# Patient Record
Sex: MALE | Race: WHITE | Employment: OTHER | URBAN - NONMETROPOLITAN AREA
[De-identification: names, ages, dates, MRNs, and addresses within clinical notes are randomized per-mention and may not be internally consistent; named-entity substitution may affect disease eponyms.]

---

## 2020-10-04 NOTE — PROGRESS NOTES
[facility]  Pulmonary medicine clinic initial consult note. Patient: Christopher Jennings  : 1935     Lung Nodule Screening     []? Qualifies    [x]? Does not qualify   []? Declined    []? Completed      Chief complaint/[place]:  Christopher Jennings is a 82 y.o. old male came for further evaluation regarding his dyspnea with referral from Dr. Marley Moore DO. He is currently on treatment with following inhalers/Nebs:  -Albuterol HFA 90mcg/Spray MDI, 2puffs  Q6Hprn.  -Albuterol 2.5mg nebs Q6h prn (the nebulizer). He is currently not using any oxygen supplementation at rest, exercise or during sleep/at night time. He is having shortness of breath: Yes  Onset: gradual   Duration:2 to 3 years. It is getting worse for the last 6months  Diurnal variation:  None. Functional status prior to beginning of symptoms: 5 to 6blocks on level ground. Current functional capacity on level ground: <1 block/s on level ground. He can climb steps: No  He admits to orthopnea. He admits to paroxysmal nocturnal dyspnea. He is having cough: No  Hemoptysis:No  Diurnal variation: None. He is having chest pain:No.    He is currently not using any oxygen supplementation at rest, exercise or during sleep/at night time. He was never diagnosed with pulmonary diseases I.e bronchial asthma, COPD,Pulmonary fibrosis, Sarcoidosis, pulmonary embolism,pulmonary hypertension or pleural effusion/s in the past.    He denies any hemoptysis. He was never diagnosed with pulmonary tuberculosis in the past. He denies any recent exposure to any patients with tuberculosis. He denies any recent travel to endemic places of tuberculosis.      Wells Score:    Sign/Symptom:                  Score:    Symptoms of  DVT :    0.       (3)                                 Tachycardia:               0. (1.5)  Immobilization:           0. (1.5)  History of VTE:           0. (1)  Malignancy:                0 (1)  Hemoptysis:               0. (1)  No alternative diagnosis: 3  (3)    Total score:         3      Sleeping habits:  Time to go to bed: 10:30            PM  Time to wake up: 6:00        AM    Sleep History:  Pt with history of:  Morning headache:Yes   Dryness of mouth in the morning:No  Hx of snoring:Yes- Occasionally. Witnessed apneas:No  Excessive day time sleepiness:Yes. See below for [place] score  Hypnogogic Hallucinations:NO  Hypnopompic Hallucinations:NO  Symptoms suggestive of Restless leg syndrome:NO  History of Seizures:NO  Sleep Walking:NO  Sleep Talking:NO  Sleep paralysis: NO  Cataplexy: NO      [place] Sleepiness Score:   Sitting and readin- He don't read. Watching TV:3  Sitting inactive in a public place:2- recliner  Being a passenger in a motor vehicle for an hour or more:0  Lying down in the afternoon:2  Sitting and talking to someone:0  Sitting quietly after lunch (no alcohol):3  Stopped for a few minutes in traffic while drivin  Total GTYPH:04    Neck Circumference - 18.25 in            Mallampati - IV    Patient considerations: None of these- > Wheelchair, Jacob Jacqueline, Hearing deficit, Claustrophobic, MDD, Blind, Para/Quadraplegic,       Social History:  Occupation:  He is current working: No  Type of profession: retired. History of tobacco smoking:Yes  Amount of tobacco smokin.0 PPD. Years of tobacco smokin                                    Quit smoking: Yes. Quit year: 10years back.   Current smoker: No.         History of recreational or IV drug use in the past:NO     History of exposure to coal mines/coal dust: NO  History of exposure to foundry dust/welding: NO  History of exposure to quarry/silica/sandblasting: NO  History of exposure to asbestos/working with breaks/ships: NO  History of exposure to farm dust: NO  History of recent travel to long distances: NO  History of exposure to birds, pigeons, or chickens in the past:NO  Pet animals at home:No    History of pulmonary embolism in the past: No            History of DVT in the past:No                             Review of Systems:   General/Constitutional: No recent loss of weight or appetite changes. No fever or chills. HENT: Negative. Eyes: Negative. Upper respiratory tract: No nasal stuffiness or post nasal drip. Lower respiratory tract/ lungs: No cough or sputum production. No hemoptysis. Cardiovascular: No palpitations or chest pain. Gastrointestinal: No nausea or vomiting. Neurological: No focal neurologiacal weakness. Extremities: No edema. Musculoskeletal: No complaints. Genitourinary: No complaints. Hematological: Negative. Psychiatric/Behavioral: Negative. Skin: No itching. Current Medications:      No past medical history on file. Past Surgical History:   Procedure Laterality Date    CARDIAC SURGERY  03/28/2013       No Known Allergies    Current Outpatient Medications   Medication Sig Dispense Refill    predniSONE (DELTASONE) 10 MG tablet Take 10 mg by mouth daily      atorvastatin (LIPITOR) 20 MG tablet Take 20 mg by mouth daily      albuterol sulfate HFA (VENTOLIN HFA) 108 (90 Base) MCG/ACT inhaler Inhale 2 puffs into the lungs every 6 hours as needed for Wheezing      albuterol (PROVENTIL) (2.5 MG/3ML) 0.083% nebulizer solution Take 2.5 mg by nebulization every 6 hours as needed for Wheezing      furosemide (LASIX) 20 MG tablet Take 20 mg by mouth See Admin Instructions Every other day      loratadine (CLARITIN) 10 MG tablet Take 10 mg by mouth daily      aspirin 81 MG EC tablet Take 81 mg by mouth daily       No current facility-administered medications for this visit. No family history on file.         Physical Exam:     VITALS:  /64 (Site: Left Upper Arm, Position: Sitting, Cuff Size: Large Adult)   Pulse 86   Temp 97.6 °F (36.4 °C) (Tympanic)   Ht 6' (1.829 m)   Wt 232 lb 3.2 oz (105.3 kg)   SpO2 97% Comment: on room air at rest  BMI 31.49 kg/m²   Nursing note and vitals reviewed. Constitutional: Patient appears well built and well nourished. No distress. Patient is oriented to person, place, and time. HENT:   Head: Normocephalic and atraumatic. Right Ear: External ear normal.   Left Ear: External ear normal.   Mouth/Throat: Oropharynx is clear and moist.  No oral thrush. Eyes: Conjunctivae are normal. Pupils are equal, round, and reactive to light. No scleral icterus. Neck: Neck supple. No JVD present. No tracheal deviation present. Cardiovascular: Normal rate, regular rhythm, normal heart sounds. No murmur heard. Pulmonary/Chest: Effort normal and breath sounds normal. No stridor. No respiratory distress. Occasional expiratory wheezes. No rales. Patient exhibits no tenderness. Abdominal: Soft. Patient exhibits no distension. No tenderness. Musculoskeletal: Normal range of motion. Extremities: Patient exhibits no edema and no tenderness. Lymphadenopathy:  No cervical adenopathy. Neurological: Patient is alert and oriented to person, place, and time. Skin: Skin is warm and dry. Patient is not diaphoretic. Psychiatric: Patient  has a normal mood and affect. Patient behavior is normal.     Neck Circumference - 18.25 in            Mallampati - IV    Diagnostic Data:    Radiological Data: 11/6/2019            Pulmonary function tests:  None in Epic      Echocardiogram:  None in Epic. Assessment:  -Exertional dyspnea due to ? Etiology. Differential includes Pulmonary Vs Cardiac. -CAD S/p CABG in 2013. He follows with Dr. Kelvin Gimenez  -Hypertension on meds.  -Chronic hx of tobacco smoking for 62years with 1PPD. He quit smoking 10years back.  -Abnormal chest Xray dated 11/06/2019-Bilateral basal atelectasis- needs follow up.     Recommendations/Plan:  -Please obtain latest Echocardiogram report from Dr. Rosey Boas office for review.  -Will send serum D- dimer today to evaluate for etiology of exertional dyspnea. He was advised and instructed to call my office in [facility] to go over the above test results and management. He verbalizes understanding.  - Patient to have chest X-ray PA and Lateral views in 2 to 3 weeks to follow abnormal chest X-ray. Chest Xray need to be done 2days before clinic visit.  -Schedule patient for full pulmonary function tests before clinic visit.  -Please obtain his old sleep sleep study records including base line sleep study and ? CPAP titration study reports from [facility] sleep lab for review before next visit.  -He was instructed to not to drive any motor vehicles or operate heavy equipment if he feels sleepy. -She was advised to keep his follow up with Dr. Rishi Maher for management of his CAD. -Schedule patient for follow up with my clinic in 2 to 3 weeks with recommended tests CXR, PFTS, D-dimer and old sleep study reports. Patient advised to make early appointment if needed.   -Patient and his wife were educated about my impression and plan. They verbalizes understanding.        -I personally reviewed updated the Past medical hx, Past surgical hx,Social hx, Family hx, Medications, Allergies in the discrete data section of the patient chart along with labs, Pulmonary medicine,Sleep medicine related, Pathological, Microbiological and Radiological investigations.      Addendum done on 10/8/20 at 2:16 PM EDT:  Sleep study: 10/2/2017                                    Echocardiogram:

## 2020-10-08 ENCOUNTER — OFFICE VISIT (OUTPATIENT)
Dept: PULMONOLOGY | Age: 85
End: 2020-10-08
Payer: MEDICARE

## 2020-10-08 VITALS
OXYGEN SATURATION: 97 % | SYSTOLIC BLOOD PRESSURE: 128 MMHG | WEIGHT: 232.2 LBS | BODY MASS INDEX: 31.45 KG/M2 | HEART RATE: 86 BPM | DIASTOLIC BLOOD PRESSURE: 64 MMHG | TEMPERATURE: 97.6 F | HEIGHT: 72 IN

## 2020-10-08 PROCEDURE — 99204 OFFICE O/P NEW MOD 45 MIN: CPT | Performed by: INTERNAL MEDICINE

## 2020-10-08 PROCEDURE — G8427 DOCREV CUR MEDS BY ELIG CLIN: HCPCS | Performed by: INTERNAL MEDICINE

## 2020-10-08 PROCEDURE — 4040F PNEUMOC VAC/ADMIN/RCVD: CPT | Performed by: INTERNAL MEDICINE

## 2020-10-08 PROCEDURE — 1123F ACP DISCUSS/DSCN MKR DOCD: CPT | Performed by: INTERNAL MEDICINE

## 2020-10-08 PROCEDURE — 1036F TOBACCO NON-USER: CPT | Performed by: INTERNAL MEDICINE

## 2020-10-08 PROCEDURE — G8484 FLU IMMUNIZE NO ADMIN: HCPCS | Performed by: INTERNAL MEDICINE

## 2020-10-08 PROCEDURE — G8417 CALC BMI ABV UP PARAM F/U: HCPCS | Performed by: INTERNAL MEDICINE

## 2020-10-08 RX ORDER — ALBUTEROL SULFATE 90 UG/1
2 AEROSOL, METERED RESPIRATORY (INHALATION) EVERY 6 HOURS PRN
COMMUNITY
End: 2020-11-05 | Stop reason: SDUPTHER

## 2020-10-08 RX ORDER — ASPIRIN 81 MG/1
81 TABLET ORAL DAILY
COMMUNITY

## 2020-10-08 RX ORDER — ATORVASTATIN CALCIUM 20 MG/1
20 TABLET, FILM COATED ORAL DAILY
COMMUNITY

## 2020-10-08 RX ORDER — PREDNISONE 10 MG/1
10 TABLET ORAL DAILY
COMMUNITY
End: 2021-08-19

## 2020-10-08 RX ORDER — FUROSEMIDE 20 MG/1
20 TABLET ORAL SEE ADMIN INSTRUCTIONS
COMMUNITY
End: 2022-08-25

## 2020-10-08 RX ORDER — LORATADINE 10 MG/1
10 TABLET ORAL DAILY
COMMUNITY

## 2020-10-08 RX ORDER — ALBUTEROL SULFATE 2.5 MG/3ML
2.5 SOLUTION RESPIRATORY (INHALATION) EVERY 6 HOURS PRN
COMMUNITY
End: 2020-11-05 | Stop reason: SDUPTHER

## 2020-10-08 SDOH — HEALTH STABILITY: MENTAL HEALTH: HOW OFTEN DO YOU HAVE A DRINK CONTAINING ALCOHOL?: NOT ASKED

## 2020-10-08 NOTE — PATIENT INSTRUCTIONS
Recommendations/Plan:  -Please obtain latest Echocardiogram report from Dr. Bart Rodriguez office for review.  -Will send serum D- dimer today to evaluate for etiology of exertional dyspnea. He was advised and instructed to call [facility] to go over the above test results and management. He verbalizes understanding.  - Patient to have chest X-ray PA and Lateral views in 2 to 3 weeks to follow abnormal chest X-ray. Chest Xray need to be done 2days before clinic visit.  -Schedule patient for full pulmonary function tests before clinic visit.  -Please obtain his old sleep sleep study records including base line sleep study and ? CPAP titration study reports from [facility] sleep lab for review before next visit.  -He was instructed to not to drive any motor vehicles or operate heavy equipment if he feels sleepy. -She was advised to keep his follow up with Dr. Meme Corbett for management of his CAD. -Schedule patient for follow up with my clinic in 2 to 3 weeks with recommended tests CXR, PFTS, D-dimer and old sleep study reports. Patient advised to make early appointment if needed.   -Patient and his wife were educated about my impression and plan. They verbalizes understanding.

## 2020-10-08 NOTE — PROGRESS NOTES
Neck Circumference - 18.25 in     Mallampati - IV    Lung Nodule Screening     [] Qualifies    [x] Does not qualify   [] Declined    [] Completed

## 2020-11-01 NOTE — PROGRESS NOTES
[facility]  Pulmonary medicine clinic follow up note. Patient: Angelica Priest  : 1935     Lung Nodule Screening     []? Qualifies    [x]? Does not qualify   []? Declined    []? Completed      Chief complaint/[place]:  Angelica Priest is a 82 y.o. old male came for follow up regarding his dyspnea after having recommended tests I.e PFTS and chest xray. He was initially referred from Dr. Kimberly Ospina DO. He is currently on treatment with following inhalers/Nebs:  -Albuterol HFA 90mcg/Spray MDI, 2puffs  Q6Hprn.  -Albuterol 2.5mg nebs Q6h prn (the nebulizer). He is currently not using any oxygen supplementation at rest, exercise or during sleep/at night time. He is currently not using any oxygen supplementation at rest, exercise or during sleep/at night time. He was never diagnosed with pulmonary diseases I.e bronchial asthma,Pulmonary fibrosis, Sarcoidosis, pulmonary embolism,pulmonary hypertension or pleural effusion/s in the past.     He was never diagnosed with pulmonary tuberculosis in the past. He denies any recent exposure to any patients with tuberculosis. He denies any recent travel to endemic places of tuberculosis. Social History:  Occupation:  He is current working: No  Type of profession: retired. History of tobacco smoking:Yes  Amount of tobacco smokin.0 PPD. Years of tobacco smokin                                    Quit smoking: Yes. Quit year: 10years back.   Current smoker: No.         History of recreational or IV drug use in the past:NO     History of exposure to coal mines/coal dust: NO  History of exposure to foundry dust/welding: NO  History of exposure to quarry/silica/sandblasting: NO  History of exposure to asbestos/working with breaks/ships: NO  History of exposure to farm dust: NO  History of recent travel to long distances: NO  History of exposure to birds, pigeons, or chickens in the past:NO  Pet animals at home:No    History of pulmonary embolism in the past: No            History of DVT in the past:No                             Review of Systems:   General/Constitutional: he lost 1lb of weight from the last visit with normal appetite. She gained 7lbs of weight from her old sleep study in 2017. No fever or chills. HENT: Negative. Eyes: Negative. Upper respiratory tract: No nasal stuffiness or post nasal drip. Lower respiratory tract/ lungs: No cough or sputum production. No hemoptysis. Cardiovascular: No palpitations or chest pain. Gastrointestinal: No nausea or vomiting. Neurological: No focal neurologiacal weakness. Extremities: No edema. Musculoskeletal: No complaints. Genitourinary: No complaints. Hematological: Negative. Psychiatric/Behavioral: Negative. Skin: No itching. Current Medications:      No past medical history on file. Past Surgical History:   Procedure Laterality Date    CARDIAC SURGERY  03/28/2013       No Known Allergies    Current Outpatient Medications   Medication Sig Dispense Refill    predniSONE (DELTASONE) 10 MG tablet Take 10 mg by mouth daily      atorvastatin (LIPITOR) 20 MG tablet Take 20 mg by mouth daily      albuterol sulfate HFA (VENTOLIN HFA) 108 (90 Base) MCG/ACT inhaler Inhale 2 puffs into the lungs every 6 hours as needed for Wheezing      albuterol (PROVENTIL) (2.5 MG/3ML) 0.083% nebulizer solution Take 2.5 mg by nebulization every 6 hours as needed for Wheezing      furosemide (LASIX) 20 MG tablet Take 20 mg by mouth See Admin Instructions Every other day      loratadine (CLARITIN) 10 MG tablet Take 10 mg by mouth daily      aspirin 81 MG EC tablet Take 81 mg by mouth daily       No current facility-administered medications for this visit. No family history on file.         Physical Exam:     VITALS:  /60 (Site: Left Upper Arm, Position: Sitting, Cuff Size: Large Adult)   Pulse 76   Temp 97.6 °F (36.4 °C) (Tympanic)   Ht 6' (1.829 m)   Wt 231 lb (104.8 kg)   SpO2 96% Comment: on room air at rest  BMI 31.33 kg/m²   Nursing note and vitals reviewed. Constitutional: Patient appears moderately built and moderately nourished. No distress. Patient is oriented to person, place, and time. HENT:   Head: Normocephalic and atraumatic. Right Ear: External ear normal.   Left Ear: External ear normal.   Mouth/Throat: Oropharynx is clear and moist.  No oral thrush. Eyes: Conjunctivae are normal. Pupils are equal, round, and reactive to light. No scleral icterus. Neck: Neck supple. No JVD present. No tracheal deviation present. Cardiovascular: Normal rate, regular rhythm, normal heart sounds. No murmur heard. Pulmonary/Chest: Effort normal and breath sounds normal. No stridor. No respiratory distress. No wheezes. No rales. Patient exhibits no tenderness. Abdominal: Soft. Patient exhibits no distension. No tenderness. Musculoskeletal: Normal range of motion. Extremities: Patient exhibits bilateral 1+ edema and no tenderness. Lymphadenopathy:  No cervical adenopathy. Neurological: Patient is alert and oriented to person, place, and time. Skin: Skin is warm and dry. Patient is not diaphoretic. Psychiatric: Patient  has a normal mood and affect. Patient behavior is normal.     Neck Circumference - 18.25 in            Mallampati - IV    Diagnostic Data:    Radiological Data: 11/6/2019            Echocardiogram: 2/19/19                Sleep study: 10/2/2017          D-dimer: 10/08/2020      Chest Xray: 10/29/2020      Chest Xray:    The above radiological study films were reviewed by me.     PFTs:10/29/2020      Six Minute Walk Test done on 11/5/20 at 1:16 PM EST    Karoline Matters 1935    Six minute walk test done in my office today by medical assistant Miss: Meredith Leyden  Oxygen saturation at rest on room air was (Percent): 96  Oxygen saturation on room air with exertion dropped to (Percent): 97 Time from beginning of the test to above desaturation: 6 minute 0 seconds. Patient don't need any oxygen supplementation. Please see scanned six minute walk test document in media for details. Assessment:  -Severe COPD- newly diagnosed. -Exertional dyspnea due to COPD Vs CHF. -CAD S/p CABG in 2013. He follows with Dr. Annalise Santiago  -Chronic systolic and diastolic CHF. He follows with Dr. Dempsey Marker  -Hypertension on meds.  -Chronic hx of tobacco smoking for 62years with 1PPD. He quit smoking 10years back.  -Abnormal chest Xray dated 11/06/2019-Bilateral basal atelectasis- Stable since 2019. Recommendations/Plan:  -Will start patient on Anora 1puff daily in am. Rekha Liu educated and demonstrated by me in my office how to use Anora. Patient verbalizes understanding. He was detailed about mechanism of action of drug along with associated side effects. He agreed to take the risk and medication. He verbalizes understanding.  - Start patient on Albuterol HFA 90mcg/Spray MDI, 2puffs  Q6Hprn. He  was informed about adverse effects of Albuterol HFA. He verbalizes understanding.  -He was instructed to use Albuterol HFA  inhaler 2 puffs Q6h prn or Albuterol 2.5mg nebs Q6h prn (the nebulizer) at one time as rescue medication not both at the same time. He verbalizes understanding.   -Patient advised to continue current inhalers and keep good compliance. Patient verbalizes understanding.  -Send [id number] level. - Patient educated to update his pneumococcal vaccine with family physician and take influenza vaccine in coming season with out fail. Patient verbalizes understanding.  - Schedule patient for Pulmonary rehab consult as soon as possible for pulmonary rehab therapy for his COPD once cleared by his Cardiologist DO Shaylee.  - Schedule patient for follow up with my clinic in 3months for clinical re evaluation. Patient advised to make early appointment if needed.   - Patient and his wife were educated about my impression and plan. Patient verbalizes understanding.      -I personally reviewed updated the Past medical hx, Past surgical hx,Social hx, Family hx, Medications, Allergies in the discrete data section of the patient chart along with labs, Pulmonary medicine,Sleep medicine related, Pathological, Microbiological and Radiological investigations. Total time spent interviewing the patient and/or family, evaluating lab data and X-ray data and processing orders was 45 Minutes. I personally spent more than 50% of the appointment time face to face with the patient providing counseling and coordinating the patient's care.

## 2020-11-05 ENCOUNTER — OFFICE VISIT (OUTPATIENT)
Dept: PULMONOLOGY | Age: 85
End: 2020-11-05
Payer: MEDICARE

## 2020-11-05 VITALS
WEIGHT: 231 LBS | HEART RATE: 76 BPM | TEMPERATURE: 97.6 F | BODY MASS INDEX: 31.29 KG/M2 | HEIGHT: 72 IN | SYSTOLIC BLOOD PRESSURE: 122 MMHG | OXYGEN SATURATION: 96 % | DIASTOLIC BLOOD PRESSURE: 60 MMHG

## 2020-11-05 PROCEDURE — 3023F SPIROM DOC REV: CPT | Performed by: INTERNAL MEDICINE

## 2020-11-05 PROCEDURE — G8417 CALC BMI ABV UP PARAM F/U: HCPCS | Performed by: INTERNAL MEDICINE

## 2020-11-05 PROCEDURE — G8926 SPIRO NO PERF OR DOC: HCPCS | Performed by: INTERNAL MEDICINE

## 2020-11-05 PROCEDURE — 1036F TOBACCO NON-USER: CPT | Performed by: INTERNAL MEDICINE

## 2020-11-05 PROCEDURE — G8484 FLU IMMUNIZE NO ADMIN: HCPCS | Performed by: INTERNAL MEDICINE

## 2020-11-05 PROCEDURE — 4040F PNEUMOC VAC/ADMIN/RCVD: CPT | Performed by: INTERNAL MEDICINE

## 2020-11-05 PROCEDURE — 94618 PULMONARY STRESS TESTING: CPT | Performed by: INTERNAL MEDICINE

## 2020-11-05 PROCEDURE — G8427 DOCREV CUR MEDS BY ELIG CLIN: HCPCS | Performed by: INTERNAL MEDICINE

## 2020-11-05 PROCEDURE — 99215 OFFICE O/P EST HI 40 MIN: CPT | Performed by: INTERNAL MEDICINE

## 2020-11-05 PROCEDURE — 1123F ACP DISCUSS/DSCN MKR DOCD: CPT | Performed by: INTERNAL MEDICINE

## 2020-11-05 RX ORDER — ALBUTEROL SULFATE 90 UG/1
2 AEROSOL, METERED RESPIRATORY (INHALATION) 4 TIMES DAILY PRN
Qty: 1 INHALER | Refills: 8 | Status: SHIPPED | OUTPATIENT
Start: 2020-11-05

## 2020-11-05 RX ORDER — UMECLIDINIUM BROMIDE AND VILANTEROL TRIFENATATE 62.5; 25 UG/1; UG/1
1 POWDER RESPIRATORY (INHALATION) DAILY
Qty: 30 PUFF | Refills: 11 | Status: SHIPPED
Start: 2020-11-05 | End: 2021-02-11 | Stop reason: ALTCHOICE

## 2020-11-05 RX ORDER — ALBUTEROL SULFATE 2.5 MG/3ML
2.5 SOLUTION RESPIRATORY (INHALATION) EVERY 6 HOURS PRN
Qty: 120 VIAL | Refills: 8
Start: 2020-11-05 | End: 2022-08-25

## 2020-11-05 NOTE — PATIENT INSTRUCTIONS
Recommendations/Plan:  -Will start patient on Anora 1puff daily in am. Levy Brown educated and demonstrated by me in my office how to use Anora. Patient verbalizes understanding. He was detailed about mechanism of action of drug along with associated side effects. He agreed to take the risk and medication. He verbalizes understanding.  - Start patient on Albuterol HFA 90mcg/Spray MDI, 2puffs  Q6Hprn. He  was informed about adverse effects of Albuterol HFA. He verbalizes understanding.  -He was instructed to use Albuterol HFA  inhaler 2 puffs Q6h prn or Albuterol 2.5mg nebs Q6h prn (the nebulizer) at one time as rescue medication not both at the same time. He verbalizes understanding.   -Patient advised to continue current inhalers and keep good compliance. Patient verbalizes understanding.  -Send Pbgq-1-[id number] level. - Patient educated to update his pneumococcal vaccine with family physician and take influenza vaccine in coming season with out fail. Patient verbalizes understanding.  - Schedule patient for Pulmonary rehab consult as soon as possible for pulmonary rehab therapy for his COPD once cleared by his Cardiologist DO Shaylee.  - Schedule patient for follow up with my clinic in 3months for clinical re evaluation. Patient advised to make early appointment if needed. - Patient and his wife were educated about my impression and plan. Patient verbalizes understanding.

## 2020-11-05 NOTE — PROGRESS NOTES
Neck Circumference -   18.25 inches  Mallampati - IV    Lung Nodule Screening     [] Qualifies    [x] Does not qualify   [] Declined    [] Completed

## 2020-11-10 ENCOUNTER — TELEPHONE (OUTPATIENT)
Dept: RESPIRATORY THERAPY | Age: 85
End: 2020-11-10

## 2020-11-10 NOTE — TELEPHONE ENCOUNTER
PULMONARY REHABILITATION REFERRAL  COPD Exacerbation    Pulmonary Rehab Evaluation order received. I briefly spoke with pt about our rehab program and the benefits of Pulmonary rehab. [place] [facility] [place] asked pt if he decides to do re[place] if [place] [facility] [place] [facility] that [place] closer to [place]. Pt states he would prefer to do at hospital in [facility]. So I told the pt that I will send the order to [facility]. Pt had no other questions.

## 2021-01-17 NOTE — PROGRESS NOTES
Social History:  Occupation:  He is current working: No  Type of profession: retired. History of tobacco smoking:Yes  Amount of tobacco smokin.0 PPD. Years of tobacco smokin                                    Quit smoking: Yes. Quit year: 10years back. Current smoker: No.         History of recreational or IV drug use in the past:NO     History of exposure to coal mines/coal dust: NO  History of exposure to foundry dust/welding: NO  History of exposure to quarry/silica/sandblasting: NO  History of exposure to asbestos/working with breaks/ships: NO  History of exposure to farm dust: NO  History of recent travel to long distances: NO  History of exposure to birds, pigeons, or chickens in the past:NO  Pet animals at home:No    History of pulmonary embolism in the past: No            History of DVT in the past:No                             Review of Systems:   General/Constitutional: he lost 14lbs of weight from the last visit with normal appetite. No fever or chills. HENT: Negative. Eyes: Negative. Upper respiratory tract: No nasal stuffiness or post nasal drip. Lower respiratory tract/ lungs: See HPI. No hemoptysis. Cardiovascular: No palpitations or chest pain. Gastrointestinal: No nausea or vomiting. Neurological: No focal neurologiacal weakness. Extremities: No edema. Musculoskeletal: No complaints. Genitourinary: No complaints. Hematological: Negative. Psychiatric/Behavioral: Negative. Skin: No itching. Current Medications:      No past medical history on file.     Past Surgical History:   Procedure Laterality Date    CARDIAC SURGERY  2013       No Known Allergies    Current Outpatient Medications   Medication Sig Dispense Refill    umeclidinium-vilanterol (ANORO ELLIPTA) 62.5-25 MCG/INH AEPB inhaler Inhale 1 puff into the lungs daily 30 puff 11    albuterol sulfate HFA (VENTOLIN HFA) 108 (90 Base) MCG/ACT inhaler Inhale 2 puffs into the lungs 4 times daily as needed for Wheezing 1 Inhaler 8    albuterol (PROVENTIL) (2.5 MG/3ML) 0.083% nebulizer solution Take 3 mLs by nebulization every 6 hours as needed for Wheezing or Shortness of Breath 120 vial 8    predniSONE (DELTASONE) 10 MG tablet Take 10 mg by mouth daily      atorvastatin (LIPITOR) 20 MG tablet Take 20 mg by mouth daily      furosemide (LASIX) 20 MG tablet Take 20 mg by mouth See Admin Instructions Every other day      loratadine (CLARITIN) 10 MG tablet Take 10 mg by mouth daily      aspirin 81 MG EC tablet Take 81 mg by mouth daily       No current facility-administered medications for this visit. No family history on file. Physical Exam:     VITALS:  /64 (Site: Left Upper Arm, Position: Sitting, Cuff Size: Large Adult)   Pulse 77   Temp 97.3 °F (36.3 °C) (Temporal)   Ht 6' (1.829 m)   Wt 217 lb 3.2 oz (98.5 kg)   SpO2 98% Comment: Room air at rest  BMI 29.46 kg/m²   Nursing note and vitals reviewed. Constitutional: Patient appears moderately built and moderately nourished. No distress. Patient is oriented to person, place, and time. HENT:   Head: Normocephalic and atraumatic. Right Ear: External ear normal.   Left Ear: External ear normal.   Mouth/Throat: Oropharynx is clear and moist.  No oral thrush. Eyes: Conjunctivae are normal. Pupils are equal, round, and reactive to light. No scleral icterus. Neck: Neck supple. No JVD present. No tracheal deviation present. Cardiovascular: Normal rate, regular rhythm, normal heart sounds. No murmur heard. Pulmonary/Chest: Effort normal and breath sounds normal. No stridor. No respiratory distress. No wheezes. No rales. Patient exhibits no tenderness. Abdominal: Soft. Patient exhibits no distension. No tenderness. Musculoskeletal: Normal range of motion. Extremities: Patient exhibits no edema and no tenderness. Lymphadenopathy:  No cervical adenopathy.    Neurological: Patient is alert and oriented to person, place, and time. Skin: Skin is warm and dry. Patient is not diaphoretic. Psychiatric: Patient  has a normal mood and affect. Patient behavior is normal.        Neck Circumference - 18.25 in            Mallampati - IV    Diagnostic Data:    Radiological Data: 11/6/2019            Echocardiogram: 2/19/19                Sleep study: 10/2/2017          D-dimer: 10/08/2020      Chest Xray: 10/29/2020      Chest Xray:    The above radiological study films were reviewed by me. PFTs:10/29/2020      Six Minute Walk Test done on 11/5/20 at 1:16 PM JAMES Vital 1935    Six minute walk test done in my office today by medical assistant Miss: Trevin Caraballo  Oxygen saturation at rest on room air was (Percent): 96  Oxygen saturation on room air with exertion dropped to (Percent): 97      Time from beginning of the test to above desaturation: 6 minute 0 seconds. Patient don't need any oxygen supplementation. Please see scanned six minute walk test document in media for details. [id number] result: 11/5/2020                  Assessment:  -Severe COPD- not under control with his current therapy. He had an episode of COPD exacerbation in December requiring administration of systemic steroids with oral route. He still taking 10 mg of prednisone p.o. daily. -Exertional dyspnea due to COPD Vs CHF-Improving  -CAD S/p CABG in 2013. He follows with Dr. Jose Edwards  -Chronic systolic and diastolic CHF. He follows with Dr. Aruna Driver  -Hypertension on meds.  -Chronic hx of tobacco smoking for 62years with 1PPD. He quit smoking 10years back.  -Abnormal chest Xray dated 11/06/2019-Bilateral basal atelectasis- Stable since 2019. Recommendations/Plan:  -Stop Anora 1puff daily in am.  -Will start patient onTrelegy Ellipta 100mcg/ 62.5mcg/25mcg per puff, 1puff daily in am. Cristóbal Mendes Browneducated and demonstrated in my office how to use Trelegy Ellipta. He verbalizes understanding.  He was detailed about mechanism of action of drug along with associated side effects. He agreed to take the risk and medication. He verbalizes understanding.  -He was advised to taper and discontinue Prednisone after discussing with his Family Physician.  -Continue Albuterol HFA 90mcg/Spray MDI, 2puffs  Q6Hprn.   -He was instructed to use Albuterol HFA  inhaler 2 puffs Q6h prn or Albuterol 2.5mg nebs Q6h prn (the nebulizer) at one time as rescue medication not both at the same time. He verbalizes understanding.   -Patient advised to continue current inhalers and keep good compliance. Patient verbalizes understanding.  -Patient educated to update his pneumococcal vaccine with family physician and take influenza vaccine in coming season with out fail. Patient verbalizes understanding.  -Schedule patient for follow up with my clinic in 6months for clinical re evaluation with Spirometry before clinic visit. Patient advised to make early appointment if needed.  -Patient and his wife were educated about my impression and plan. Patient verbalizes understanding.      -His cardiologist Jose Brown did not clear the patient for Pulmonary rehab therapy for his COPD. -I personally reviewed updated the Past medical hx, Past surgical hx,Social hx, Family hx, Medications, Allergies in the discrete data section of the patient chart along with labs, Pulmonary medicine,Sleep medicine related, Pathological, Microbiological and Radiological investigations.

## 2021-02-11 ENCOUNTER — OFFICE VISIT (OUTPATIENT)
Dept: PULMONOLOGY | Age: 86
End: 2021-02-11
Payer: MEDICARE

## 2021-02-11 VITALS
HEART RATE: 77 BPM | OXYGEN SATURATION: 98 % | TEMPERATURE: 97.3 F | DIASTOLIC BLOOD PRESSURE: 64 MMHG | WEIGHT: 217.2 LBS | SYSTOLIC BLOOD PRESSURE: 132 MMHG | BODY MASS INDEX: 29.42 KG/M2 | HEIGHT: 72 IN

## 2021-02-11 DIAGNOSIS — J44.9 STAGE 3 SEVERE COPD BY GOLD CLASSIFICATION (HCC): Primary | ICD-10-CM

## 2021-02-11 DIAGNOSIS — Z72.0 TOBACCO ABUSE: ICD-10-CM

## 2021-02-11 DIAGNOSIS — R06.09 EXERTIONAL DYSPNEA: ICD-10-CM

## 2021-02-11 PROCEDURE — G8926 SPIRO NO PERF OR DOC: HCPCS | Performed by: INTERNAL MEDICINE

## 2021-02-11 PROCEDURE — 1036F TOBACCO NON-USER: CPT | Performed by: INTERNAL MEDICINE

## 2021-02-11 PROCEDURE — 4040F PNEUMOC VAC/ADMIN/RCVD: CPT | Performed by: INTERNAL MEDICINE

## 2021-02-11 PROCEDURE — G8484 FLU IMMUNIZE NO ADMIN: HCPCS | Performed by: INTERNAL MEDICINE

## 2021-02-11 PROCEDURE — 1123F ACP DISCUSS/DSCN MKR DOCD: CPT | Performed by: INTERNAL MEDICINE

## 2021-02-11 PROCEDURE — 3023F SPIROM DOC REV: CPT | Performed by: INTERNAL MEDICINE

## 2021-02-11 PROCEDURE — G8427 DOCREV CUR MEDS BY ELIG CLIN: HCPCS | Performed by: INTERNAL MEDICINE

## 2021-02-11 PROCEDURE — G8417 CALC BMI ABV UP PARAM F/U: HCPCS | Performed by: INTERNAL MEDICINE

## 2021-02-11 PROCEDURE — 99214 OFFICE O/P EST MOD 30 MIN: CPT | Performed by: INTERNAL MEDICINE

## 2021-02-11 RX ORDER — FLUTICASONE FUROATE, UMECLIDINIUM BROMIDE AND VILANTEROL TRIFENATATE 100; 62.5; 25 UG/1; UG/1; UG/1
1 POWDER RESPIRATORY (INHALATION) DAILY
Qty: 30 EACH | Refills: 11 | Status: SHIPPED
Start: 2021-02-11 | End: 2021-06-02 | Stop reason: SINTOL

## 2021-02-11 NOTE — PROGRESS NOTES
Neck Circumference - 18.25    Mallampati - 4      Lung Nodule Screening     [] Qualifies    [x] Does not qualify   [] Declined    [] Completed

## 2021-06-02 ENCOUNTER — TELEPHONE (OUTPATIENT)
Dept: PULMONOLOGY | Age: 86
End: 2021-06-02

## 2021-06-02 DIAGNOSIS — J44.9 STAGE 3 SEVERE COPD BY GOLD CLASSIFICATION (HCC): Primary | ICD-10-CM

## 2021-06-02 RX ORDER — UMECLIDINIUM BROMIDE AND VILANTEROL TRIFENATATE 62.5; 25 UG/1; UG/1
1 POWDER RESPIRATORY (INHALATION) DAILY
Qty: 30 PUFF | Refills: 11 | Status: SHIPPED | OUTPATIENT
Start: 2021-06-02 | End: 2022-06-06 | Stop reason: SDUPTHER

## 2021-06-02 NOTE — TELEPHONE ENCOUNTER
Pts wife Darby Larson called and LM about Trelegy, he is have worse headaches and more confused on it. Is requesting to go back on Anoro. Please advise.

## 2021-06-02 NOTE — TELEPHONE ENCOUNTER
Trelegy Ellipta 100mcg/ 62.5mcg/25mcg per puff, 1puff daily in am was discontinued as per request.  I sent a new prescription for Anora 1puff daily in am to pharmacy. Please inform patient.

## 2021-08-15 NOTE — PROGRESS NOTES
[facility]  Pulmonary medicine clinic follow up note. Patient: Tabitha Haas  : 1935     Lung Nodule Screening     []? Qualifies    [x]? Does not qualify   []? Declined    []? Completed      Chief complaint/[place]:  Tabitha Haas is a 82 y.o. old male came for follow up regarding his severe COPD after having recommended spirometry testing before clinic visit. At the last visit he was started Trelegy Ellipta 100mcg/ 62.5mcg/25mcg per puff, 1puff daily in am after stopping Anoro. However, after starting on Trelegy Ellipta 100mcg/ 62.5mcg/25mcg per puff, 1puff daily in am development of worsening of headaches and confusion in the morning. His Trelegy Ellipta 100mcg/ 62.5mcg/25mcg per puff, 1puff daily in am was discontinued and placed back on Anoro    His oral prednisone was discontinued. He is waking up in the morning with headaches. He was initially referred from Dr. Annabelle Kang DO. On today's questioning:  He denies cough or expectoration. He denies hemoptysis. He denies fever or chills. He denies recent hospitalizations or emergency room visits. He is using his prescribed inhalers with good compliance. He is using rescue inhaler/nebs rarely. He admits to recent loss of weight by voluntarily practicing diet therapy. His appetite is normal.  He denies recent decline in functional status. He was referred to pulmonary rehab therapy for his COPD at the last visit. Patient had a history of developing hypotension after doing cardiac rehab therapy in the past. He was not cleared by his cardiologist Dr. Annabelle Kang to start pulmonary rehab therapy. He is currently on treatment with following inhalers/Nebs:  - Anora 1puff daily in am.   -Albuterol HFA 90mcg/Spray MDI, 2puffs  Q6Hprn.   -Albuterol 2.5mg nebs Q6h prn (the nebulizer).      At the last visit he was started Trelegy Ellipta 100mcg/ 62.5mcg/25mcg per puff, 1puff daily in am after stopping Anoro. However, after starting on Trelegy Ellipta 100mcg/ 62.5mcg/25mcg per puff, 1puff daily in am development of worsening of headaches and confusion in the morning. His Trelegy Ellipta 100mcg/ 62.5mcg/25mcg per puff, 1puff daily in am was discontinued and placed back on Anoro    He is currently not using any oxygen supplementation at rest, exercise or during sleep/at night time. He was never diagnosed with pulmonary diseases I.e bronchial asthma,Pulmonary fibrosis, Sarcoidosis, pulmonary embolism,pulmonary hypertension or pleural effusion/s in the past.     He was never diagnosed with pulmonary tuberculosis in the past. He denies any recent exposure to any patients with tuberculosis. He denies any recent travel to endemic places of tuberculosis. Social History:  Occupation:  He is current working: No  Type of profession: retired. History of tobacco smoking:Yes  Amount of tobacco smokin.0 PPD. Years of tobacco smokin                                    Quit smoking: Yes. Quit year: 10years back. Current smoker: No.         History of recreational or IV drug use in the past:NO     History of exposure to coal mines/coal dust: NO  History of exposure to foundry dust/welding: NO  History of exposure to quarry/silica/sandblasting: NO  History of exposure to asbestos/working with breaks/ships: NO  History of exposure to farm dust: NO  History of recent travel to long distances: NO  History of exposure to birds, pigeons, or chickens in the past:NO  Pet animals at home:No    History of pulmonary embolism in the past: No            History of DVT in the past:No                             Review of Systems:   General/Constitutional: he lost 26lbs of weight from the last visit. He lost 33 pounds of weight from his old baseline sleep study performed on 2017. No fever or chills. HENT: Negative. Eyes: Negative.   Upper respiratory tract: No nasal stuffiness or post nasal drip. Lower respiratory tract/ lungs: No cough or sputum production. Cardiovascular: No palpitations or chest pain. Gastrointestinal: No nausea or vomiting. Neurological: No focal neurologiacal weakness. He is waking up in the morning with headaches. Extremities: No edema. Musculoskeletal: No complaints. Genitourinary: No complaints. Hematological: Negative. Psychiatric/Behavioral: Negative. Skin: No itching. Current Medications:      No past medical history on file. Past Surgical History:   Procedure Laterality Date    CARDIAC SURGERY  03/28/2013       No Known Allergies    Current Outpatient Medications   Medication Sig Dispense Refill    umeclidinium-vilanterol (ANORO ELLIPTA) 62.5-25 MCG/INH AEPB inhaler Inhale 1 puff into the lungs daily 30 puff 11    albuterol sulfate HFA (VENTOLIN HFA) 108 (90 Base) MCG/ACT inhaler Inhale 2 puffs into the lungs 4 times daily as needed for Wheezing 1 Inhaler 8    albuterol (PROVENTIL) (2.5 MG/3ML) 0.083% nebulizer solution Take 3 mLs by nebulization every 6 hours as needed for Wheezing or Shortness of Breath 120 vial 8    atorvastatin (LIPITOR) 20 MG tablet Take 20 mg by mouth daily      loratadine (CLARITIN) 10 MG tablet Take 10 mg by mouth daily      aspirin 81 MG EC tablet Take 81 mg by mouth daily      predniSONE (DELTASONE) 10 MG tablet Take 10 mg by mouth daily (Patient not taking: Reported on 8/19/2021)      furosemide (LASIX) 20 MG tablet Take 20 mg by mouth See Admin Instructions Every other day (Patient not taking: Reported on 8/19/2021)       No current facility-administered medications for this visit. No family history on file.         Physical Exam:     VITALS:  /60 (Site: Left Upper Arm, Position: Sitting, Cuff Size: Medium Adult)   Pulse 70   Temp 97.5 °F (36.4 °C)   Ht 6' (1.829 m)   Wt 191 lb 9.6 oz (86.9 kg)   SpO2 99% Comment: room air at rest  BMI 25.99 kg/m²   Nursing note and minute walk test document in media for details. [id number] result: 11/5/2020          Spirometry: Performed on 12 August 2021          His FEV1 improved from previous spirometry done in the past.      Assessment:  -Severe COPD-  under control with his current therapy. -Exertional dyspnea due to COPD Vs CHF-Improving  -CAD S/p CABG in 2013. He follows with Dr. Theresa Lawrence  -Chronic systolic and diastolic CHF. He follows with Dr. Theresa Mcdaniel  -Hypertension on meds.  -Chronic hx of tobacco smoking for 62years with 1PPD. He quit smoking 10years back.  -Abnormal chest Xray dated 11/06/2019-Bilateral basal atelectasis- Stable since 2019. Recommendations/Plan:  -Stop Trelegy Ellipta 100mcg/ 62.5mcg/25mcg per puff, 1puff daily in am.  -Continue Anora 1puff daily in am.   -He was advised to taper and discontinue Prednisone after discussing with his Family Physician.  -Continue Albuterol HFA 90mcg/Spray MDI, 2puffs  Q6Hprn.   -He was instructed to use Albuterol HFA  inhaler 2 puffs Q6h prn or Albuterol 2.5mg nebs Q6h prn (the nebulizer) at one time as rescue medication not both at the same time. He verbalizes understanding.   -Patient advised to continue current inhalers and keep good compliance. Patient verbalizes understanding.  -Patient educated to update his pneumococcal vaccine with family physician and take influenza vaccine in coming season with out fail. Patient verbalizes understanding.  -Nocturnal pulse ox study on room air to check for the continuation/discontinuation of patient current home O2 at night time.  -Schedule patient for follow up with my clinic in 1year for clinical re evaluation with Spirometry before clinic visit. Patient advised to make early appointment if needed.  -Patient and his wife were educated about my impression and plan. Patient verbalizes understanding.      -His cardiologist Steve Mohan did not clear the patient for Pulmonary rehab therapy for his COPD.   -I personally reviewed updated the Past medical hx, Past surgical hx,Social hx, Family hx, Medications, Allergies in the discrete data section of the patient chart along with labs, Pulmonary medicine,Sleep medicine related, Pathological, Microbiological and Radiological investigations.

## 2021-08-17 DIAGNOSIS — Z72.0 TOBACCO ABUSE: ICD-10-CM

## 2021-08-17 DIAGNOSIS — J44.9 STAGE 3 SEVERE COPD BY GOLD CLASSIFICATION (HCC): ICD-10-CM

## 2021-08-17 DIAGNOSIS — R06.09 EXERTIONAL DYSPNEA: ICD-10-CM

## 2021-08-19 ENCOUNTER — OFFICE VISIT (OUTPATIENT)
Dept: PULMONOLOGY | Age: 86
End: 2021-08-19
Payer: MEDICARE

## 2021-08-19 VITALS
DIASTOLIC BLOOD PRESSURE: 60 MMHG | BODY MASS INDEX: 25.95 KG/M2 | HEIGHT: 72 IN | OXYGEN SATURATION: 99 % | SYSTOLIC BLOOD PRESSURE: 120 MMHG | WEIGHT: 191.6 LBS | TEMPERATURE: 97.5 F | HEART RATE: 70 BPM

## 2021-08-19 DIAGNOSIS — J44.9 STAGE 3 SEVERE COPD BY GOLD CLASSIFICATION (HCC): Primary | ICD-10-CM

## 2021-08-19 PROCEDURE — G8427 DOCREV CUR MEDS BY ELIG CLIN: HCPCS | Performed by: INTERNAL MEDICINE

## 2021-08-19 PROCEDURE — 4040F PNEUMOC VAC/ADMIN/RCVD: CPT | Performed by: INTERNAL MEDICINE

## 2021-08-19 PROCEDURE — G8926 SPIRO NO PERF OR DOC: HCPCS | Performed by: INTERNAL MEDICINE

## 2021-08-19 PROCEDURE — 1123F ACP DISCUSS/DSCN MKR DOCD: CPT | Performed by: INTERNAL MEDICINE

## 2021-08-19 PROCEDURE — G8417 CALC BMI ABV UP PARAM F/U: HCPCS | Performed by: INTERNAL MEDICINE

## 2021-08-19 PROCEDURE — 1036F TOBACCO NON-USER: CPT | Performed by: INTERNAL MEDICINE

## 2021-08-19 PROCEDURE — 99214 OFFICE O/P EST MOD 30 MIN: CPT | Performed by: INTERNAL MEDICINE

## 2021-08-19 PROCEDURE — 3023F SPIROM DOC REV: CPT | Performed by: INTERNAL MEDICINE

## 2021-08-19 NOTE — PATIENT INSTRUCTIONS
Recommendations/Plan:  -Stop Trelegy Ellipta 100mcg/ 62.5mcg/25mcg per puff, 1puff daily in am.  -Continue Anora 1puff daily in am.   -He was advised to taper and discontinue Prednisone after discussing with his Family Physician.  -Continue Albuterol HFA 90mcg/Spray MDI, 2puffs  Q6Hprn.   -He was instructed to use Albuterol HFA  inhaler 2 puffs Q6h prn or Albuterol 2.5mg nebs Q6h prn (the nebulizer) at one time as rescue medication not both at the same time. He verbalizes understanding.   -Patient advised to continue current inhalers and keep good compliance. Patient verbalizes understanding.  -Patient educated to update his pneumococcal vaccine with family physician and take influenza vaccine in coming season with out fail. Patient verbalizes understanding.  -Nocturnal pulse ox study on room air to check for the continuation/discontinuation of patient current home O2 at night time.  -Schedule patient for follow up with my clinic in 1year for clinical re evaluation with Spirometry before clinic visit. Patient advised to make early appointment if needed.  -Patient and his wife were educated about my impression and plan. Patient verbalizes understanding.

## 2021-08-25 DIAGNOSIS — J44.9 STAGE 3 SEVERE COPD BY GOLD CLASSIFICATION (HCC): ICD-10-CM

## 2022-06-06 DIAGNOSIS — J44.9 STAGE 3 SEVERE COPD BY GOLD CLASSIFICATION (HCC): ICD-10-CM

## 2022-06-06 RX ORDER — UMECLIDINIUM BROMIDE AND VILANTEROL TRIFENATATE 62.5; 25 UG/1; UG/1
1 POWDER RESPIRATORY (INHALATION) DAILY
Qty: 30 EACH | Refills: 11 | Status: SHIPPED | OUTPATIENT
Start: 2022-06-06 | End: 2023-06-06

## 2022-06-06 NOTE — TELEPHONE ENCOUNTER
Angelica Priest called requesting a refill on the following medications:  Requested Prescriptions     Pending Prescriptions Disp Refills    umeclidinium-vilanterol (ANORO ELLIPTA) 62.5-25 MCG/INH AEPB inhaler 30 each 11     Sig: Inhale 1 puff into the lungs daily     Pharmacy verified:  [email or web address]  [facility] [place], [place]    Date of last visit: 08/19/2022  Date of next visit (if applicable): 89/92/9769      *pt is transferring scripts to [facility] pharmacy, no longer uses [facility]

## 2022-06-07 DIAGNOSIS — J44.9 STAGE 3 SEVERE COPD BY GOLD CLASSIFICATION (HCC): ICD-10-CM

## 2022-06-07 RX ORDER — UMECLIDINIUM BROMIDE AND VILANTEROL TRIFENATATE 62.5; 25 UG/1; UG/1
POWDER RESPIRATORY (INHALATION)
OUTPATIENT
Start: 2022-06-07

## 2022-08-02 NOTE — PROGRESS NOTES
[facility]  Pulmonary medicine clinic follow up note. Patient: Jr Moses  : 1935     Lung Nodule Screening     [] Qualifies    [x] Does not qualify   [] Declined    [] Completed      Chief complaint/[place]:  Jr Moses is a 82 y.o. old male came for follow up regarding his severe COPD after having recommended spirometry testing before clinic visit. He was requested to have a nocturnal pulse oximetry study at the last visit to check requirement of oxygen at nighttime. Patient never 6underwent nocturnal pulse oximetry study so far. His oral prednisone was discontinued. He is waking up in the morning with headaches. He gave a history of chronic headaches. He is having headaches even before started coming to my clinic. He is currently following with his family physician Dr. Alie Perdomo for further evaluation of headaches. He was initially referred from Dr. Shan He DO. On today's questioning:  He admits to cough with clear expectoration in the morning. According to his wife's shortness of breath is getting worse. He denies hemoptysis. He denies fever or chills. He denies recent hospitalizations or emergency room visits. He is using his prescribed inhalers with excellent compliance. He is using his rescue inhaler frequently    He denies any recent loss of weight with appetite changes. He admits to recent decline in functional status. He was referred to pulmonary rehab therapy for his COPD at the last visit. Patient had a history of developing hypotension after doing cardiac rehab therapy in the past. He was not cleared by his cardiologist Dr. Shan He to start pulmonary rehab therapy. He is currently on treatment with following inhalers/Nebs:  - Anora 1puff daily in am.   -Albuterol HFA 90mcg/Spray MDI, 2puffs  Q6Hprn.   -Albuterol 2.5mg nebs Q6h prn (the nebulizer).      He was started Trelegy Ellipta 100mcg/ 62.5mcg/25mcg per puff, 1puff daily in am after stopping Anoro. However, after starting on Trelegy Ellipta 100mcg/ 62.5mcg/25mcg per puff, 1puff daily in am development of worsening of headaches and confusion in the morning. His Trelegy Ellipta 100mcg/ 62.5mcg/25mcg per puff, 1puff daily in am was discontinued and placed back on Anoro    He is currently not using any oxygen supplementation at rest, exercise or during sleep/at night time. He was never diagnosed with pulmonary diseases I.e bronchial asthma,Pulmonary fibrosis, Sarcoidosis, pulmonary embolism,pulmonary hypertension or pleural effusion/s in the past.     He was never diagnosed with pulmonary tuberculosis in the past. He denies any recent exposure to any patients with tuberculosis. He denies any recent travel to endemic places of tuberculosis. Social History:  Occupation:  He is current working: No  Type of profession: retired. History of tobacco smoking:Yes  Amount of tobacco smokin.0 PPD. Years of tobacco smokin                                    Quit smoking: Yes. Quit year: 10years back. Current smoker: No.         History of recreational or IV drug use in the past:NO     History of exposure to coal mines/coal dust: NO  History of exposure to foundry dust/welding: NO  History of exposure to quarry/silica/sandblasting: NO  History of exposure to asbestos/working with breaks/ships: NO  History of exposure to farm dust: NO  History of recent travel to long distances: NO  History of exposure to birds, pigeons, or chickens in the past:NO  Pet animals at home:No    History of pulmonary embolism in the past: No            History of DVT in the past:No                             Review of Systems:   General/Constitutional: he gained 11lbs of weight from the last visit. No fever or chills. HENT: Negative. Eyes: Negative. Upper respiratory tract: No nasal stuffiness or post nasal drip.   Lower respiratory tract/ lungs: See HPI  Cardiovascular: No palpitations or chest pain. Gastrointestinal: No nausea or vomiting. Neurological: No focal neurologiacal weakness. Extremities: No edema. Musculoskeletal: No complaints. Genitourinary: No complaints. Hematological: Negative. Psychiatric/Behavioral: Negative. Skin: No itching. Current Medications:      No past medical history on file. Past Surgical History:   Procedure Laterality Date    CARDIAC SURGERY  03/28/2013       No Known Allergies    Current Outpatient Medications   Medication Sig Dispense Refill    midodrine (PROAMATINE) 2.5 MG tablet Take 2.5 mg by mouth daily      Docusate Calcium (STOOL SOFTENER PO) Take by mouth      umeclidinium-vilanterol (ANORO ELLIPTA) 62.5-25 MCG/INH AEPB inhaler Inhale 1 puff into the lungs daily 30 each 11    albuterol sulfate HFA (VENTOLIN HFA) 108 (90 Base) MCG/ACT inhaler Inhale 2 puffs into the lungs 4 times daily as needed for Wheezing 1 Inhaler 8    albuterol (PROVENTIL) (2.5 MG/3ML) 0.083% nebulizer solution Take 3 mLs by nebulization every 6 hours as needed for Wheezing or Shortness of Breath 120 vial 8    atorvastatin (LIPITOR) 20 MG tablet Take 20 mg by mouth daily      loratadine (CLARITIN) 10 MG tablet Take 10 mg by mouth daily      aspirin 81 MG EC tablet Take 81 mg by mouth daily       No current facility-administered medications for this visit. No family history on file. Physical Exam:     VITALS:  /62 (Site: Left Upper Arm, Position: Sitting, Cuff Size: Medium Adult)   Pulse 65   Temp 97.5 °F (36.4 °C)   Ht 6' (1.829 m)   Wt 202 lb (91.6 kg)   SpO2 99% Comment: room air at rest  BMI 27.40 kg/m²   Nursing note and vitals reviewed. Constitutional: Patient appears moderately built and moderately nourished. No distress. Patient is oriented to person, place, and time. HENT:   Head: Normocephalic and atraumatic.    Right Ear: External ear normal.   Left Ear: External ear normal.   Mouth/Throat: Oropharynx is clear and moist.  No oral thrush. Eyes: Conjunctivae are normal. Pupils are equal, round, and reactive to light. No scleral icterus. Neck: Neck supple. No JVD present. No tracheal deviation present. Cardiovascular: Normal rate, regular rhythm, normal heart sounds. No murmur heard. Pulmonary/Chest: Effort normal and breath sounds normal. No stridor. No respiratory distress. Occasional bilateral expiratory wheezes. No rales. Patient exhibits no tenderness. Abdominal: Soft. Patient exhibits no distension. No tenderness. Musculoskeletal: Normal range of motion. Extremities: Patient exhibits no edema and no tenderness. Lymphadenopathy:  No cervical adenopathy. Neurological: Patient is alert and oriented to person, place, and time. Skin: Skin is warm and dry. Patient is not diaphoretic. Psychiatric: Patient  has a normal mood and affect. Patient behavior is normal.         Neck Circumference - 18.25 in            Mallampati - IV    Diagnostic Data:    Echocardiogram: 2/19/19                Sleep study: 10/2/2017          D-dimer: 10/08/2020      Chest Xray: Performed on 29 October 2020    The above radiological study films were reviewed by me. PFTs:10/29/2020  The above test result reviewed. [id number] result: 11/5/2020          Spirometry: Performed on 12 August 2021  The above test result reviewed. Spirometry: Performed on 17 August 2022          His FEV1 decreased from previous spirometry done in the past.      Six Minute Walk Test done on 8/25/22 at 11:02 AM BENT    Apurva Doty 1935    He needs no home O2 at rest. He needs 2 LPM of home O2 with exercise. He will be evaluated for nocturnal home O2 requirement- see separte order. Please see scanned six minute walk test document in media for details with above date. DME Medical Necessity Documentation    Patient was seen in my clinic on 8/25/22 for the diagnosis COPD.   I am prescribing oxygen because the diagnosis and testing requires the patient to have oxygen in the home. Condition will improve or be benefited by oxygen use. The patient is  able to perform good mobility in a home setting and therefore does require the use of a portable oxygen system. Assessment:  -Severe COPD- Not under control with his current therapy. His FEV1 got worse compared to last year spirometry with recent worsening of shortness of breath.  -Exertional dyspnea due to COPD Vs CHF. -CAD S/p CABG in 2013. He follows with Dr. Andreia Auguste  -Chronic systolic and diastolic CHF. He follows with Dr. Jose Cuevas  -Hypertension on meds.  -Chronic hx of tobacco smoking for 62years with 1PPD. He quit smoking 10years back.  -Abnormal chest Xray dated 11/06/2019-Bilateral basal atelectasis- Stable since 2019. Recommendations/Plan:  -Continue Anora 1puff daily in am.   -Start patient on Flovent HFA 110mcg/INH, 2INH BID. He  was informed about adverse effects of Flovent. He verbalizes understanding.  -Continue Albuterol HFA 90mcg/Spray MDI, 2puffs  Q6Hprn.   -He was instructed to use Albuterol HFA  inhaler 2 puffs Q6h prn or Albuterol 2.5mg nebs Q6h prn (the nebulizer) at one time as rescue medication not both at the same time. He verbalizes understanding.   -Patient advised to continue current inhalers and keep good compliance. Patient verbalizes understanding.  -Patient educated to update his pneumococcal vaccine with family physician and take influenza vaccine in coming season with out fail. Patient verbalizes understanding.  -Wilver Guardado needs no home O2 at rest. He needs 2LPM of home O2 with exercise. He will be evaluated for nocturnal home O2 requirement- see separte order.  -We will reschedule nocturnal pulse ox study on room air to check for the requirement of home O2 at night time.  -Schedule patient for follow up with my clinic in 6months for clinical re evaluation of his COPD and in 1 year with

## 2022-08-22 DIAGNOSIS — J44.9 STAGE 3 SEVERE COPD BY GOLD CLASSIFICATION (HCC): ICD-10-CM

## 2022-08-25 ENCOUNTER — TELEPHONE (OUTPATIENT)
Dept: PULMONOLOGY | Age: 87
End: 2022-08-25

## 2022-08-25 ENCOUNTER — OFFICE VISIT (OUTPATIENT)
Dept: PULMONOLOGY | Age: 87
End: 2022-08-25
Payer: MEDICARE

## 2022-08-25 VITALS
SYSTOLIC BLOOD PRESSURE: 118 MMHG | WEIGHT: 202 LBS | OXYGEN SATURATION: 99 % | HEART RATE: 65 BPM | BODY MASS INDEX: 27.36 KG/M2 | HEIGHT: 72 IN | DIASTOLIC BLOOD PRESSURE: 62 MMHG | TEMPERATURE: 97.5 F

## 2022-08-25 DIAGNOSIS — J44.9 STAGE 3 SEVERE COPD BY GOLD CLASSIFICATION (HCC): ICD-10-CM

## 2022-08-25 DIAGNOSIS — J44.9 STAGE 3 SEVERE COPD BY GOLD CLASSIFICATION (HCC): Primary | ICD-10-CM

## 2022-08-25 PROCEDURE — G8417 CALC BMI ABV UP PARAM F/U: HCPCS | Performed by: INTERNAL MEDICINE

## 2022-08-25 PROCEDURE — G8427 DOCREV CUR MEDS BY ELIG CLIN: HCPCS | Performed by: INTERNAL MEDICINE

## 2022-08-25 PROCEDURE — 1123F ACP DISCUSS/DSCN MKR DOCD: CPT | Performed by: INTERNAL MEDICINE

## 2022-08-25 PROCEDURE — 1036F TOBACCO NON-USER: CPT | Performed by: INTERNAL MEDICINE

## 2022-08-25 PROCEDURE — 99215 OFFICE O/P EST HI 40 MIN: CPT | Performed by: INTERNAL MEDICINE

## 2022-08-25 PROCEDURE — 3023F SPIROM DOC REV: CPT | Performed by: INTERNAL MEDICINE

## 2022-08-25 PROCEDURE — 94618 PULMONARY STRESS TESTING: CPT | Performed by: INTERNAL MEDICINE

## 2022-08-25 RX ORDER — FLUTICASONE PROPIONATE 110 UG/1
2 AEROSOL, METERED RESPIRATORY (INHALATION) 2 TIMES DAILY
Qty: 1 EACH | Refills: 11 | Status: SHIPPED | OUTPATIENT
Start: 2022-08-25 | End: 2023-08-25

## 2022-08-25 RX ORDER — MIDODRINE HYDROCHLORIDE 2.5 MG/1
2.5 TABLET ORAL DAILY
COMMUNITY

## 2022-08-25 NOTE — PROGRESS NOTES
Neck Circumference -   18.25  Mallampati - 4    Lung Nodule Screening     [] Qualifies    [] Does not qualify   [] Declined    [] Completed

## 2022-08-25 NOTE — PATIENT INSTRUCTIONS
Recommendations/Plan:  -Continue Anora 1puff daily in am.   -Start patient on Flovent HFA 110mcg/INH, 2INH BID. He  was informed about adverse effects of Flovent. He verbalizes understanding.  -Continue Albuterol HFA 90mcg/Spray MDI, 2puffs  Q6Hprn.   -He was instructed to use Albuterol HFA  inhaler 2 puffs Q6h prn or Albuterol 2.5mg nebs Q6h prn (the nebulizer) at one time as rescue medication not both at the same time. He verbalizes understanding.   -Patient advised to continue current inhalers and keep good compliance. Patient verbalizes understanding.  -Patient educated to update his pneumococcal vaccine with family physician and take influenza vaccine in coming season with out fail. Patient verbalizes understanding.  -Noel Chen needs no home O2 at rest. He needs 2LPM of home O2 with exercise. He will be evaluated for nocturnal home O2 requirement- see separte order.  -We will reschedule nocturnal pulse ox study on room air to check for the requirement of home O2 at night time.  -Schedule patient for follow up with my clinic in 6months for clinical re evaluation of his COPD and in 1 year with spirometry before clinic visit. Patient advised to make early appointment if needed.  -Patient and his wife were educated about my impression and plan. Patient verbalizes understanding.

## 2022-09-06 DIAGNOSIS — J44.9 STAGE 3 SEVERE COPD BY GOLD CLASSIFICATION (HCC): ICD-10-CM

## 2023-01-30 NOTE — PROGRESS NOTES
[facility]  Pulmonary medicine clinic follow up note. Patient: Aaliyah Tadeo  : 1935     Lung Nodule Screening     [] Qualifies    [x] Does not qualify   [] Declined    [] Completed      Chief complaint/[place]:  Aaliyah Tadeo is a 82 y.o. old male came for follow up regarding his severe COPD. He underwent nocturnal pulse oximetry study at the last visit to check requirement of oxygen at nighttime. He was prescribed with a 2 L of oxygen to use during exercise at the last visit. However, so far he did not received any home oxygen delivered by DME company. He was initially referred from Dr. Tran Salazar DO. On today's questioning:  He denies cough or expectoration. He denies hemoptysis. He denies fever or chills. He denies recent hospitalizations or emergency room visits. He is using his prescribed inhalers with good compliance. He is using rescue inhaler/nebs rarely. He denies recent loss of weight or appetite changes. He denies recent decline in functional status. He was referred to pulmonary rehab therapy for his COPD at the last visit. Patient had a history of developing hypotension after doing cardiac rehab therapy in the past. He was not cleared by his cardiologist Dr. Tran Salazar to start pulmonary rehab therapy. He is currently on treatment with following inhalers/Nebs:  - Anora 1puff daily in am.   -Flovent  mcg 2 puffs twice daily  -Albuterol HFA 90mcg/Spray MDI, 2puffs  Q6Hprn.   -Albuterol 2.5mg nebs Q6h prn (the nebulizer). He was started Trelegy Ellipta 100mcg/ 62.5mcg/25mcg per puff, 1puff daily in am after stopping Anoro. However, after starting on Trelegy Ellipta 100mcg/ 62.5mcg/25mcg per puff, 1puff daily in am development of worsening of headaches and confusion in the morning.   His Trelegy Ellipta 100mcg/ 62.5mcg/25mcg per puff, 1puff daily in am was discontinued and placed back on Anoro    He is currently not using any oxygen supplementation at rest, exercise or during sleep/at night time. However, he was prescribed with a 2 L of oxygen on nasal cannula with exercise and during sleep at the last visit. He was never diagnosed with pulmonary diseases I.e bronchial asthma,Pulmonary fibrosis, Sarcoidosis, pulmonary embolism,pulmonary hypertension or pleural effusion/s in the past.     He was never diagnosed with pulmonary tuberculosis in the past. He denies any recent exposure to any patients with tuberculosis. He denies any recent travel to endemic places of tuberculosis. Social History:  Occupation:  He is current working: No  Type of profession: retired. History of tobacco smoking:Yes  Amount of tobacco smokin.0 PPD. Years of tobacco smokin                                    Quit smoking: Yes. Quit year: 10years back. Current smoker: No.         History of recreational or IV drug use in the past:NO     History of exposure to coal mines/coal dust: NO  History of exposure to foundry dust/welding: NO  History of exposure to quarry/silica/sandblasting: NO  History of exposure to asbestos/working with breaks/ships: NO  History of exposure to farm dust: NO  History of recent travel to long distances: NO  History of exposure to birds, pigeons, or chickens in the past:NO  Pet animals at home:No    History of pulmonary embolism in the past: No            History of DVT in the past:No                             Review of Systems:   General/Constitutional: he gained 8lbs of weight from the last visit. No fever or chills. HENT: Negative. Eyes: Negative. Upper respiratory tract: No nasal stuffiness or post nasal drip. Lower respiratory tract/ lungs: See HPI  Cardiovascular: No palpitations or chest pain. Gastrointestinal: No nausea or vomiting. Neurological: No focal neurologiacal weakness. Extremities: No edema.   Musculoskeletal: No complaints. Genitourinary: No complaints. Hematological: Negative. Psychiatric/Behavioral: Negative. Skin: No itching. Current Medications:      No past medical history on file. Past Surgical History:   Procedure Laterality Date    CARDIAC SURGERY  03/28/2013       No Known Allergies    Current Outpatient Medications   Medication Sig Dispense Refill    midodrine (PROAMATINE) 2.5 MG tablet Take 2.5 mg by mouth daily      Docusate Calcium (STOOL SOFTENER PO) Take by mouth      fluticasone (FLOVENT HFA) 110 MCG/ACT inhaler Inhale 2 puffs into the lungs 2 times daily Rinse mouth after its use. 1 each 11    umeclidinium-vilanterol (ANORO ELLIPTA) 62.5-25 MCG/INH AEPB inhaler Inhale 1 puff into the lungs daily 30 each 11    albuterol sulfate HFA (VENTOLIN HFA) 108 (90 Base) MCG/ACT inhaler Inhale 2 puffs into the lungs 4 times daily as needed for Wheezing 1 Inhaler 8    albuterol (PROVENTIL) (2.5 MG/3ML) 0.083% nebulizer solution Take 3 mLs by nebulization every 6 hours as needed for Wheezing or Shortness of Breath 120 vial 8    atorvastatin (LIPITOR) 20 MG tablet Take 20 mg by mouth daily      loratadine (CLARITIN) 10 MG tablet Take 10 mg by mouth daily      aspirin 81 MG EC tablet Take 81 mg by mouth daily       No current facility-administered medications for this visit. No family history on file. Physical Exam:     VITALS:  /64 (Site: Left Upper Arm, Position: Sitting, Cuff Size: Large Adult)   Pulse 69   Temp 98.1 °F (36.7 °C)   Ht 6' (1.829 m)   Wt 210 lb (95.3 kg)   SpO2 97% Comment: room air at rest  BMI 28.48 kg/m²   Nursing note and vitals reviewed. Constitutional: Patient appears moderately built and moderately nourished. No distress. Patient is oriented to person, place, and time. HENT:   Head: Normocephalic and atraumatic. Right Ear: External ear normal.   Left Ear: External ear normal.   Mouth/Throat: Oropharynx is clear and moist.  No oral thrush.   Eyes: Conjunctivae are normal. Pupils are equal, round, and reactive to light. No scleral icterus. Neck: Neck supple. No JVD present. No tracheal deviation present. Cardiovascular: Normal rate, regular rhythm, normal heart sounds. No murmur heard. Pulmonary/Chest: Effort normal and breath sounds normal. No stridor. No respiratory distress. No wheezes. No rales. Patient exhibits no tenderness. Abdominal: Soft. Patient exhibits no distension. No tenderness. Musculoskeletal: Normal range of motion. Extremities: Patient exhibits no edema and no tenderness. Lymphadenopathy:  No cervical adenopathy. Neurological: Patient is alert and oriented to person, place, and time. Skin: Skin is warm and dry. Patient is not diaphoretic. Psychiatric: Patient  has a normal mood and affect. Patient behavior is normal.       Neck Circumference - 18.25 in            Mallampati - IV    Diagnostic Data:    Echocardiogram: 2/19/19                Sleep study: 10/2/2017          D-dimer: 10/08/2020      Chest Xray: Performed on 29 October 2020    The above radiological study films were reviewed by me. [id number] result: 11/5/2020          Spirometry: Performed on 17 August 2022          His FEV1 decreased from previous spirometry done in the past.      Six Minute Walk Test done on 8/25/22 at 11:02 AM BENT    Sandra Garcia 1935    He needs no home O2 at rest. He needs 2 LPM of home O2 with exercise. He will be evaluated for nocturnal home O2 requirement- see separte order. Please see scanned six minute walk test document in media for details with above date. DME Medical Necessity Documentation    Patient was seen in my clinic on 8/25/22 for the diagnosis COPD. I am prescribing oxygen because the diagnosis and testing requires the patient to have oxygen in the home. Condition will improve or be benefited by oxygen use.   The patient is  able to perform good mobility in a home setting and therefore does require the use of a portable oxygen system. Nocturnal pulse oximetry study performed on 27 August 2022:        Assessment:  -Severe COPD-under control with his current therapy.  -Chronic hypoxic respiratory failure due to COPD. He is on 2 L of oxygen on nasal cannula with exercise and during nighttime.  -Exertional dyspnea due to COPD Vs CHF-improved. -CAD S/p CABG in 2013. He follows with Dr. Yandel Maldonado  -Chronic systolic and diastolic CHF. He follows with Dr. Darrell Han  -Hypertension on meds.  -Chronic hx of tobacco smoking for 62years with 1PPD. He quit smoking 10years back.  -Abnormal chest Xray dated 11/06/2019-Bilateral basal atelectasis- Stable since 2019. Recommendations/Plan:  -Continue Anora 1puff daily in am. Refills  given for 1year to local pharmacy  -Continue Flovent HFA 110mcg/INH, 2INH BID.  -Continue Albuterol HFA 90mcg/Spray MDI, 2puffs  Q6Hprn.   -He was instructed to use Albuterol HFA  inhaler 2 puffs Q6h prn or Albuterol 2.5mg nebs Q6h prn (the nebulizer) at one time as rescue medication not both at the same time. He verbalizes understanding.   -Patient advised to continue current inhalers and keep good compliance. Patient verbalizes understanding.  -Patient educated to update his pneumococcal vaccine with family physician and take influenza vaccine in coming season with out fail. Patient verbalizes understanding.  -Kirsten Knapp needs no home O2 at rest. He needs 2LPM of home O2 with exercise and at nighttime.  -He was advised to keep the scheduled follow-up appointment on 24 August 2023 with spirometry before clinic visit. Patient advised to make early appointment if needed.  -Patient and his wife were educated about my impression and plan. Patient verbalizes understanding.        -At the request of patient he was given a prescription for handicapped/ disability parking sticker for 5years in the past. He was advised to contact his family physician for future renewals.  He told me that he can not walk more than few steps because of shortness of breath. He is on home O2 therapy. Addendum done on 2/23/23 at 10:45 AM EST:    Six Minute Walk Test done on 2/23/23 at 10:45 AM EST    Guillermina Barajas 1935    He needs no home O2 at rest. No need for oxygen with exercise. He will be evaluated for nocturnal home O2 requirement- see separte order. Please see scanned six minute walk test document in media for details with above date. Plan:  -Guillermina Barajas needs no home O2 at rest or with exercise. -Nocturnal pulse ox study on room air to check for the continuation/discontinuation of patient current home O2 at night time.  -Patient advised and instructed to call my office in 1 week after undergoing nocturnal pulse ox study to go over the above test results and management. He verbalizes understanding.

## 2023-02-23 ENCOUNTER — OFFICE VISIT (OUTPATIENT)
Dept: PULMONOLOGY | Age: 88
End: 2023-02-23
Payer: MEDICARE

## 2023-02-23 VITALS
TEMPERATURE: 98.1 F | WEIGHT: 210 LBS | DIASTOLIC BLOOD PRESSURE: 64 MMHG | SYSTOLIC BLOOD PRESSURE: 122 MMHG | HEIGHT: 72 IN | HEART RATE: 69 BPM | OXYGEN SATURATION: 97 % | BODY MASS INDEX: 28.44 KG/M2

## 2023-02-23 DIAGNOSIS — J44.9 STAGE 3 SEVERE COPD BY GOLD CLASSIFICATION (HCC): Primary | ICD-10-CM

## 2023-02-23 DIAGNOSIS — J44.9 STAGE 3 SEVERE COPD BY GOLD CLASSIFICATION (HCC): ICD-10-CM

## 2023-02-23 PROCEDURE — 99214 OFFICE O/P EST MOD 30 MIN: CPT | Performed by: INTERNAL MEDICINE

## 2023-02-23 PROCEDURE — 1123F ACP DISCUSS/DSCN MKR DOCD: CPT | Performed by: INTERNAL MEDICINE

## 2023-02-23 PROCEDURE — G8427 DOCREV CUR MEDS BY ELIG CLIN: HCPCS | Performed by: INTERNAL MEDICINE

## 2023-02-23 PROCEDURE — 3023F SPIROM DOC REV: CPT | Performed by: INTERNAL MEDICINE

## 2023-02-23 PROCEDURE — 1036F TOBACCO NON-USER: CPT | Performed by: INTERNAL MEDICINE

## 2023-02-23 PROCEDURE — G8417 CALC BMI ABV UP PARAM F/U: HCPCS | Performed by: INTERNAL MEDICINE

## 2023-02-23 PROCEDURE — G8484 FLU IMMUNIZE NO ADMIN: HCPCS | Performed by: INTERNAL MEDICINE

## 2023-02-23 RX ORDER — ALBUTEROL SULFATE 90 UG/1
2 AEROSOL, METERED RESPIRATORY (INHALATION) 4 TIMES DAILY PRN
Qty: 1 EACH | Refills: 8 | Status: SHIPPED | OUTPATIENT
Start: 2023-02-23

## 2023-02-23 NOTE — PATIENT INSTRUCTIONS
Recommendations/Plan:  -Continue Anora 1puff daily in am. Refills  given for 1year to local pharmacy  -Continue Flovent HFA 110mcg/INH, 2INH BID.  -Continue Albuterol HFA 90mcg/Spray MDI, 2puffs  Q6Hprn.   -He was instructed to use Albuterol HFA  inhaler 2 puffs Q6h prn or Albuterol 2.5mg nebs Q6h prn (the nebulizer) at one time as rescue medication not both at the same time. He verbalizes understanding.   -Patient advised to continue current inhalers and keep good compliance. Patient verbalizes understanding.  -Patient educated to update his pneumococcal vaccine with family physician and take influenza vaccine in coming season with out fail. Patient verbalizes understanding.  -John Tuttle needs no home O2 at rest. He needs 2LPM of home O2 with exercise and at nighttime.  -He was advised to keep the scheduled follow-up appointment on 24 August 2023 with spirometry before clinic visit. Patient advised to make early appointment if needed.  -Patient and his wife were educated about my impression and plan. Patient verbalizes understanding.

## 2023-02-27 DIAGNOSIS — J44.9 STAGE 3 SEVERE COPD BY GOLD CLASSIFICATION (HCC): ICD-10-CM

## 2023-03-08 ENCOUNTER — TELEPHONE (OUTPATIENT)
Dept: PULMONOLOGY | Age: 88
End: 2023-03-08

## 2023-03-08 DIAGNOSIS — J44.9 STAGE 3 SEVERE COPD BY GOLD CLASSIFICATION (HCC): Primary | ICD-10-CM

## 2023-03-08 NOTE — TELEPHONE ENCOUNTER
We received a fax that pts Noc Pulse ox was cancelled. I called [facility] and they told me I needed to call [facility] for the reason, they did not know. Called [facility] and talked to Cristopher Menendez and she said Pt refused. Dr BARR called Pt and he did refuse. Today they came into the [facility] office and he does want to have this done since the doctor suggested it. Called [facility] and they are unsure if they can use the same order but I did refax to them and they will try. Just an FYI.

## 2023-03-21 DIAGNOSIS — J44.9 STAGE 3 SEVERE COPD BY GOLD CLASSIFICATION (HCC): ICD-10-CM

## 2023-03-21 PROCEDURE — 94618 PULMONARY STRESS TESTING: CPT | Performed by: INTERNAL MEDICINE

## 2024-12-21 NOTE — TELEPHONE ENCOUNTER
Patients wife called in regarding this. I gave her [facility]'s phone number to call about setting up the overnight pulse ox. 21-Dec-2024 23:40